# Patient Record
Sex: MALE | Race: WHITE | NOT HISPANIC OR LATINO
[De-identification: names, ages, dates, MRNs, and addresses within clinical notes are randomized per-mention and may not be internally consistent; named-entity substitution may affect disease eponyms.]

---

## 2021-03-04 ENCOUNTER — DASHBOARD ENCOUNTERS (OUTPATIENT)
Age: 26
End: 2021-03-04

## 2021-03-04 ENCOUNTER — OFFICE VISIT (OUTPATIENT)
Dept: URBAN - NONMETROPOLITAN AREA CLINIC 2 | Facility: CLINIC | Age: 26
End: 2021-03-04
Payer: COMMERCIAL

## 2021-03-04 DIAGNOSIS — R19.8 TENESMUS: ICD-10-CM

## 2021-03-04 DIAGNOSIS — K64.8 HEMORRHOID PROLAPSE: ICD-10-CM

## 2021-03-04 PROCEDURE — 99204 OFFICE O/P NEW MOD 45 MIN: CPT | Performed by: INTERNAL MEDICINE

## 2021-03-04 NOTE — HPI-TODAY'S VISIT:
3/4/21: Mr. Sneed is a pleasant 24 y/o M who presents for evaluation of rectal bleeding and pain with defecation.  He was seen almost 4 years ago in clinic for similar complaints.  At that time he had a normal CBC and inflammatory markers and was treated empirically for hemorrhoids.  Today he reports that he feels that he has a prolapsed hemorrhoid.  This has not been bleeding lately.  He wonders about banding.